# Patient Record
Sex: MALE | Race: WHITE | NOT HISPANIC OR LATINO | Employment: OTHER | URBAN - METROPOLITAN AREA
[De-identification: names, ages, dates, MRNs, and addresses within clinical notes are randomized per-mention and may not be internally consistent; named-entity substitution may affect disease eponyms.]

---

## 2023-11-27 ENCOUNTER — OFFICE VISIT (OUTPATIENT)
Dept: OPHTHALMOLOGY | Facility: CLINIC | Age: 78
End: 2023-11-27

## 2023-11-27 DIAGNOSIS — T15.01XA RUST RING OF RIGHT CORNEA DUE TO METALLIC FOREIGN BODY: Primary | ICD-10-CM

## 2023-11-27 PROCEDURE — 65222 REMOVE FOREIGN BODY FROM EYE: CPT | Mod: RIGHT SIDE | Performed by: OPTOMETRIST

## 2023-11-27 PROCEDURE — 92002 INTRM OPH EXAM NEW PATIENT: CPT | Performed by: OPTOMETRIST

## 2023-11-27 RX ORDER — PANTOPRAZOLE SODIUM 40 MG/1
40 TABLET, DELAYED RELEASE ORAL DAILY
COMMUNITY
Start: 2023-03-09

## 2023-11-27 RX ORDER — SIMVASTATIN 10 MG/1
TABLET, FILM COATED ORAL EVERY 24 HOURS
COMMUNITY

## 2023-11-27 RX ORDER — POLYMYXIN B SULFATE AND TRIMETHOPRIM 1; 10000 MG/ML; [USP'U]/ML
1 SOLUTION OPHTHALMIC EVERY 4 HOURS
Qty: 10 ML | Refills: 0 | Status: SHIPPED | OUTPATIENT
Start: 2023-11-27 | End: 2023-12-07

## 2023-11-27 RX ORDER — SERTRALINE HYDROCHLORIDE 100 MG/1
TABLET, FILM COATED ORAL EVERY 24 HOURS
COMMUNITY

## 2023-11-27 RX ORDER — ERYTHROMYCIN 5 MG/G
OINTMENT OPHTHALMIC
COMMUNITY
Start: 2023-11-24

## 2023-11-27 RX ORDER — TRAZODONE HYDROCHLORIDE 100 MG/1
TABLET ORAL
COMMUNITY

## 2023-11-27 ASSESSMENT — CONF VISUAL FIELD
OS_SUPERIOR_NASAL_RESTRICTION: 0
OD_SUPERIOR_TEMPORAL_RESTRICTION: 0
OS_NORMAL: 1
OD_INFERIOR_NASAL_RESTRICTION: 0
OS_INFERIOR_TEMPORAL_RESTRICTION: 0
METHOD: COUNTING FINGERS
OD_NORMAL: 1
OD_SUPERIOR_NASAL_RESTRICTION: 0
OS_INFERIOR_NASAL_RESTRICTION: 0
OD_INFERIOR_TEMPORAL_RESTRICTION: 0
OS_SUPERIOR_TEMPORAL_RESTRICTION: 0

## 2023-11-27 ASSESSMENT — ENCOUNTER SYMPTOMS
CONSTITUTIONAL NEGATIVE: 0
HEMATOLOGIC/LYMPHATIC NEGATIVE: 0
PSYCHIATRIC NEGATIVE: 0
GASTROINTESTINAL NEGATIVE: 0
ALLERGIC/IMMUNOLOGIC NEGATIVE: 0
ENDOCRINE NEGATIVE: 0
EYES NEGATIVE: 0
RESPIRATORY NEGATIVE: 0
NEUROLOGICAL NEGATIVE: 0
MUSCULOSKELETAL NEGATIVE: 0
CARDIOVASCULAR NEGATIVE: 1

## 2023-11-27 ASSESSMENT — VISUAL ACUITY
OD_SC+: -2
METHOD: SNELLEN - LINEAR
OD_SC: 20/30
OS_SC: 20/20

## 2023-11-27 NOTE — PROGRESS NOTES
Rust ring removed, no complications. Polytrim QID x 3 days. RTC 1 week for follow up to evaluate healing of epithelium and anterior stroma and visual acuity (VA).

## 2023-12-05 ENCOUNTER — OFFICE VISIT (OUTPATIENT)
Dept: OPHTHALMOLOGY | Facility: CLINIC | Age: 78
End: 2023-12-05

## 2023-12-05 DIAGNOSIS — H01.8 PARASITIC INFESTATION OF EYELID DUE TO DEMODEX SPECIES: ICD-10-CM

## 2023-12-05 DIAGNOSIS — B88.0 PARASITIC INFESTATION OF EYELID DUE TO DEMODEX SPECIES: ICD-10-CM

## 2023-12-05 DIAGNOSIS — B88.0 INFESTATION BY DEMODEX: ICD-10-CM

## 2023-12-05 DIAGNOSIS — H04.123 DRY EYE SYNDROME OF BOTH EYES: ICD-10-CM

## 2023-12-05 DIAGNOSIS — H01.02A SQUAMOUS BLEPHARITIS OF UPPER AND LOWER EYELIDS OF BOTH EYES: ICD-10-CM

## 2023-12-05 DIAGNOSIS — T15.01XA RUST RING OF RIGHT CORNEA DUE TO METALLIC FOREIGN BODY: Primary | ICD-10-CM

## 2023-12-05 DIAGNOSIS — H01.02B SQUAMOUS BLEPHARITIS OF UPPER AND LOWER EYELIDS OF BOTH EYES: ICD-10-CM

## 2023-12-05 PROCEDURE — 92012 INTRM OPH EXAM EST PATIENT: CPT | Performed by: OPTOMETRIST

## 2023-12-05 RX ORDER — LOTILANER OPHTHALMIC SOLUTION 2.5 MG/ML
1 SOLUTION/ DROPS OPHTHALMIC EVERY 12 HOURS
Qty: 10 ML | Refills: 0 | Status: SHIPPED | OUTPATIENT
Start: 2023-12-05 | End: 2024-01-16

## 2023-12-05 ASSESSMENT — VISUAL ACUITY
OS_SC: 20/20
OD_SC+: -1
OS_SC: 20/70
METHOD: SNELLEN - LINEAR
OD_SC: 20/20
OD_SC: 20/70

## 2023-12-05 ASSESSMENT — CONF VISUAL FIELD
OD_INFERIOR_TEMPORAL_RESTRICTION: 0
OD_INFERIOR_NASAL_RESTRICTION: 0
OD_NORMAL: 1
OD_SUPERIOR_TEMPORAL_RESTRICTION: 0
OS_SUPERIOR_TEMPORAL_RESTRICTION: 0
METHOD: COUNTING FINGERS
OS_INFERIOR_TEMPORAL_RESTRICTION: 0
OD_SUPERIOR_NASAL_RESTRICTION: 0
OS_NORMAL: 1
OS_INFERIOR_NASAL_RESTRICTION: 0
OS_SUPERIOR_NASAL_RESTRICTION: 0

## 2023-12-05 ASSESSMENT — ENCOUNTER SYMPTOMS
PSYCHIATRIC NEGATIVE: 0
GASTROINTESTINAL NEGATIVE: 0
HEMATOLOGIC/LYMPHATIC NEGATIVE: 0
CONSTITUTIONAL NEGATIVE: 0
EYES NEGATIVE: 0
MUSCULOSKELETAL NEGATIVE: 0
ALLERGIC/IMMUNOLOGIC NEGATIVE: 0
CARDIOVASCULAR NEGATIVE: 0
ENDOCRINE NEGATIVE: 0
RESPIRATORY NEGATIVE: 0
NEUROLOGICAL NEGATIVE: 0

## 2023-12-05 ASSESSMENT — SLIT LAMP EXAM - LIDS: COMMENTS: 2+ COLLARETTES

## 2023-12-05 ASSESSMENT — EXTERNAL EXAM - LEFT EYE: OS_EXAM: NORMAL

## 2023-12-05 ASSESSMENT — EXTERNAL EXAM - RIGHT EYE: OD_EXAM: NORMAL

## 2023-12-05 NOTE — PROGRESS NOTES
Last week Rust ring removed, no complications. Polytrim QID x 3 days. 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    +2-3 collarettes both eyes. Dry eye both eyes.  Start XDEMVY both eyes. 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 Instill 1 drop in each eye 2x/day for 6 weeks. On slit lamp examination there are greater than 10 collarettes along the lashes of each eyelid margin. The patient is symptomatic for eyelid erythema, crustiness of eyelids, and lid irritation . The patient has tried other OTC treatments such as lid hygiene with tea tree oil or Rx treatments such as azasite. This medication is being prescribed for and FDA approved diagnosis.     Patient can try to get this through the [facility] as well as well.

## 2024-01-24 ENCOUNTER — OFFICE VISIT (OUTPATIENT)
Dept: OPHTHALMOLOGY | Facility: CLINIC | Age: 79
End: 2024-01-24
Payer: OTHER GOVERNMENT

## 2024-01-24 DIAGNOSIS — B88.0 PARASITIC INFESTATION OF EYELID DUE TO DEMODEX SPECIES: ICD-10-CM

## 2024-01-24 DIAGNOSIS — H01.8 PARASITIC INFESTATION OF EYELID DUE TO DEMODEX SPECIES: ICD-10-CM

## 2024-01-24 DIAGNOSIS — H01.02A SQUAMOUS BLEPHARITIS OF UPPER AND LOWER EYELIDS OF BOTH EYES: ICD-10-CM

## 2024-01-24 DIAGNOSIS — H04.123 DRY EYE SYNDROME OF BOTH EYES: ICD-10-CM

## 2024-01-24 DIAGNOSIS — H01.02B SQUAMOUS BLEPHARITIS OF UPPER AND LOWER EYELIDS OF BOTH EYES: ICD-10-CM

## 2024-01-24 DIAGNOSIS — B88.0 INFESTATION BY DEMODEX: ICD-10-CM

## 2024-01-24 DIAGNOSIS — T15.01XA RUST RING OF RIGHT CORNEA DUE TO METALLIC FOREIGN BODY: Primary | ICD-10-CM

## 2024-01-24 PROCEDURE — 68761 CLOSE TEAR DUCT OPENING: CPT | Mod: LEFT SIDE | Performed by: OPTOMETRIST

## 2024-01-24 PROCEDURE — 1160F RVW MEDS BY RX/DR IN RCRD: CPT | Performed by: OPTOMETRIST

## 2024-01-24 PROCEDURE — 68761 CLOSE TEAR DUCT OPENING: CPT | Mod: RIGHT SIDE | Performed by: OPTOMETRIST

## 2024-01-24 PROCEDURE — 92012 INTRM OPH EXAM EST PATIENT: CPT | Performed by: OPTOMETRIST

## 2024-01-24 PROCEDURE — 1159F MED LIST DOCD IN RCRD: CPT | Performed by: OPTOMETRIST

## 2024-01-24 ASSESSMENT — ENCOUNTER SYMPTOMS
CARDIOVASCULAR NEGATIVE: 1
RESPIRATORY NEGATIVE: 0
PSYCHIATRIC NEGATIVE: 0
MUSCULOSKELETAL NEGATIVE: 0
GASTROINTESTINAL NEGATIVE: 0
CONSTITUTIONAL NEGATIVE: 0
ENDOCRINE NEGATIVE: 0
ALLERGIC/IMMUNOLOGIC NEGATIVE: 0
HEMATOLOGIC/LYMPHATIC NEGATIVE: 0
EYES NEGATIVE: 0
NEUROLOGICAL NEGATIVE: 0

## 2024-01-24 ASSESSMENT — VISUAL ACUITY
OD_SC+: +1
METHOD: SNELLEN - LINEAR
OS_SC: 20/20
OD_SC: 20/25

## 2024-01-24 ASSESSMENT — EXTERNAL EXAM - LEFT EYE: OS_EXAM: NORMAL

## 2024-01-24 ASSESSMENT — EXTERNAL EXAM - RIGHT EYE: OD_EXAM: NORMAL

## 2024-01-24 NOTE — PROGRESS NOTES
Last week Rust ring removed, no complications. Polytrim QID x 3 days. 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     +2-3 collarettes both eyes. Dry eye both eyes.  Start XDEMVY both eyes. 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 Instill 1 drop in each eye 2x/day for 6 weeks. On slit lamp examination there are greater than 10 collarettes along the lashes of each eyelid margin. The patient is symptomatic for eyelid erythema, crustiness of eyelids, and lid irritation . The patient has tried other OTC treatments such as lid hygiene with tea tree oil or Rx treatments such as azasite. This medication is being prescribed for and FDA approved diagnosis.      Patient can try to get this through the [facility] as well as well.     Continued FBS even though this is better and is alleviated with iVizia     Inserted punctal plugs. RLL and LLL 0.6 mm went in easy. Lot #: [id number]    RTC 6 months for all dry eye tests and consider use of T-cell inhibitors.

## 2024-07-24 ENCOUNTER — APPOINTMENT (OUTPATIENT)
Dept: OPHTHALMOLOGY | Facility: CLINIC | Age: 79
End: 2024-07-24
Payer: OTHER GOVERNMENT

## 2024-11-07 ENCOUNTER — OFFICE VISIT (OUTPATIENT)
Dept: URGENT CARE | Age: 79
End: 2024-11-07
Payer: OTHER GOVERNMENT

## 2024-11-07 VITALS
SYSTOLIC BLOOD PRESSURE: 177 MMHG | RESPIRATION RATE: 16 BRPM | HEART RATE: 73 BPM | OXYGEN SATURATION: 94 % | WEIGHT: 206 LBS | DIASTOLIC BLOOD PRESSURE: 81 MMHG

## 2024-11-07 DIAGNOSIS — S05.01XA INJURY OF CONJUNCTIVA AND CORNEAL ABRASION OF RIGHT EYE WITHOUT FOREIGN BODY, INITIAL ENCOUNTER: Primary | ICD-10-CM

## 2024-11-07 PROCEDURE — 99213 OFFICE O/P EST LOW 20 MIN: CPT | Performed by: NURSE PRACTITIONER

## 2024-11-07 PROCEDURE — 1159F MED LIST DOCD IN RCRD: CPT | Performed by: NURSE PRACTITIONER

## 2024-11-07 PROCEDURE — 1125F AMNT PAIN NOTED PAIN PRSNT: CPT | Performed by: NURSE PRACTITIONER

## 2024-11-07 RX ORDER — ERYTHROMYCIN 5 MG/G
OINTMENT OPHTHALMIC 4 TIMES DAILY
Qty: 3 G | Refills: 0 | Status: SHIPPED | OUTPATIENT
Start: 2024-11-07 | End: 2024-11-14

## 2024-11-07 ASSESSMENT — ENCOUNTER SYMPTOMS
CONSTITUTIONAL NEGATIVE: 1
HEMATOLOGIC/LYMPHATIC NEGATIVE: 1
RESPIRATORY NEGATIVE: 1
CARDIOVASCULAR NEGATIVE: 1
MUSCULOSKELETAL NEGATIVE: 1
PSYCHIATRIC NEGATIVE: 1
EYE WATERING: 1
TINGLING: 1
EYE DISCHARGE: 1
GASTROINTESTINAL NEGATIVE: 1
ENDOCRINE NEGATIVE: 1
ALLERGIC/IMMUNOLOGIC NEGATIVE: 1
EYE REDNESS: 1

## 2024-11-07 ASSESSMENT — PATIENT HEALTH QUESTIONNAIRE - PHQ9
1. LITTLE INTEREST OR PLEASURE IN DOING THINGS: NOT AT ALL
2. FEELING DOWN, DEPRESSED OR HOPELESS: NOT AT ALL
SUM OF ALL RESPONSES TO PHQ9 QUESTIONS 1 AND 2: 0

## 2024-11-07 ASSESSMENT — PAIN SCALES - GENERAL: PAINLEVEL_OUTOF10: 8

## 2024-11-07 NOTE — PROGRESS NOTES
Subjective   Patient ID: Néstor Hamilton is a 79 y.o. male. They present today with a chief complaint of foreign body in rt eye (Happened today in garage ).    History of Present Illness    Eye Problem  Location:  Right eye  Quality:  Tearing and foreign body sensation  Severity:  Moderate  Onset quality:  Sudden  Duration:  1 hour  Timing:  Constant  Progression:  Unchanged  Chronicity:  New  Context: direct trauma and machinery    Relieved by:  Nothing  Worsened by:  Nothing  Ineffective treatments:  None tried  Associated symptoms: discharge, redness, tearing and tingling     Working with metal, was wearing safety goggles      Past Medical History  Allergies as of 11/07/2024 - Reviewed 11/07/2024   Allergen Reaction Noted    Oxycodone-acetaminophen Other 11/27/2023       (Not in a hospital admission)       No past medical history on file.    Past Surgical History:   Procedure Laterality Date    CATARACT EXTRACTION EXTRACAPSULAR W/ INTRAOCULAR LENS IMPLANTATION Bilateral         reports that he has never smoked. He has never used smokeless tobacco.    Review of Systems  Review of Systems   Constitutional: Negative.    HENT: Negative.     Eyes:  Positive for discharge and redness.   Respiratory: Negative.     Cardiovascular: Negative.    Gastrointestinal: Negative.    Endocrine: Negative.    Genitourinary: Negative.    Musculoskeletal: Negative.    Skin: Negative.    Allergic/Immunologic: Negative.    Neurological:  Positive for tingling.   Hematological: Negative.    Psychiatric/Behavioral: Negative.                                    Objective    Vitals:    11/07/24 1504   BP: 177/81   Pulse: 73   Resp: 16   SpO2: 94%   Weight: 93.4 kg (206 lb)     No LMP for male patient.    Physical Exam  Vitals and nursing note reviewed.   Constitutional:       Appearance: Normal appearance. He is normal weight.   Eyes:      General:         Right eye: Foreign body present.   Cardiovascular:      Rate and Rhythm: Normal rate and  regular rhythm.   Pulmonary:      Effort: Pulmonary effort is normal.      Breath sounds: Normal breath sounds.   Abdominal:      General: Bowel sounds are normal.   Musculoskeletal:         General: Normal range of motion.   Skin:     General: Skin is warm and dry.   Neurological:      General: No focal deficit present.      Mental Status: He is alert and oriented to person, place, and time. Mental status is at baseline.   Psychiatric:         Mood and Affect: Mood normal.         Behavior: Behavior normal.         Ocular Foreign Body Removal    Date/Time: 11/7/2024 4:53 PM    Performed by: SYED Swanson  Authorized by: SYED Swanson    Consent:     Consent obtained:  Verbal    Consent given by:  Patient  Universal protocol:     Procedure explained and questions answered to patient or proxy's satisfaction: yes      Relevant documents present and verified: yes      Test results available: yes      Patient identity confirmed:  Verbally with patient  Pre-procedure details:     Imaging:  None    Fluorescein exam: yes      Fluorescein uptake: no    Anesthesia:     Local anesthetic:  None  Procedure details:     Localization method:  Fluorescein    Foreign bodies recovered:  None    Intact foreign body removal: no      Residual rust ring observed: no      Residual rust ring removed: no    Post-procedure details:     Confirmation:  No additional foreign bodies on visualization    Procedure completion:  Tolerated      Point of Care Test & Imaging Results from this visit  No results found for this visit on 11/07/24.   No results found.    Diagnostic study results (if any) were reviewed by SYED Swanson.    Assessment/Plan   Allergies, medications, history, and pertinent labs/EKGs/Imaging reviewed by SYED Swanson.     Medical Decision Making  At time of discharge patient was clinically well-appearing and HDS for outpatient management. The patient and/or family was  educated regarding diagnosis, supportive care, OTC and Rx medications. The patient and/or family was given the opportunity to ask questions prior to discharge.  They verbalized understanding of my discussion of the plans for treatment, expected course, indications to return to  or seek further evaluation in ED, and the need for timely follow up as directed.   They were provided with a work/school excuse if requested.        Orders and Diagnoses  Diagnoses and all orders for this visit:  Injury of conjunctiva and corneal abrasion of right eye without foreign body, initial encounter  -     erythromycin (Romycin) 5 mg/gram (0.5 %) ophthalmic ointment; Apply to left eye 4 times a day for 7 days. Apply Amount per Dose: 0.5 inch (~1 cm) per dose.          Patient disposition: Home    Electronically signed by SYED Swanson  3:59 PM